# Patient Record
(demographics unavailable — no encounter records)

---

## 2024-11-12 NOTE — ASSESSMENT
[FreeTextEntry1] : Aubree nKight presents for evaluation of hearing loss over the past four months. Otoscopic exam is wnl. 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 Discussed MRI brain/IAC given asymmetry. Will refer him for hearing aid evaluation.  - MRI brain/IAC - Hearing aid evaluation - clearance letter provided. - f/u after MRI.

## 2024-11-12 NOTE — DATA REVIEWED
[de-identified] : Audiogram 11/12/24: Type Ad/C tymp AD, type C tymp AS. Severe rising to moderate to profound SNHL AD. Severe to profound SNHL AS.

## 2024-11-12 NOTE — CONSULT LETTER
[Dear  ___] : Dear  [unfilled], [Consult Letter:] : I had the pleasure of evaluating your patient, [unfilled]. [Please see my note below.] : Please see my note below. [Consult Closing:] : Thank you very much for allowing me to participate in the care of this patient.  If you have any questions, please do not hesitate to contact me. [Sincerely,] : Sincerely, [FreeTextEntry3] : Arthur Olsen MD

## 2024-11-12 NOTE — REVIEW OF SYSTEMS
[Ear Pain] : ear pain [Hearing Loss] : hearing loss [Eyes Itch] : itching of the eyes [Wheezing] : wheezing [Cough] : cough [Heartburn] : heartburn [Negative] : Heme/Lymph [As Noted in HPI] : as noted in HPI [Ear Itch] : ear itch [Dizziness] : no dizziness [Vertigo] : no vertigo [Recurrent Ear Infections] : no recurrent ear infections [Ear Drainage] : no ear drainage [Ear Noises] : no ear noises [FreeTextEntry3] : watery [FreeTextEntry7] : reflux

## 2024-11-12 NOTE — HISTORY OF PRESENT ILLNESS
[de-identified] : Aubree Knight is a 76 year old man hx of CAD s/p stent placement was referred by Dr. Olmstead for evaluation of bilateral hearing loss, LT worse than RT. This has been ongoing for 4 months. He states tinnitus occurs on the LT  when he is speaking. He states currently with mild otalgia and mild itching. He denies vertigo, otorrhea, fevers or chills, recurrent or recent ear infections. He denies recent loud noise, family history of hearing loss, facial weakness.